# Patient Record
Sex: FEMALE | Race: WHITE | NOT HISPANIC OR LATINO | Employment: UNEMPLOYED
[De-identification: names, ages, dates, MRNs, and addresses within clinical notes are randomized per-mention and may not be internally consistent; named-entity substitution may affect disease eponyms.]

---

## 2022-12-02 ENCOUNTER — HOSPITAL ENCOUNTER (OUTPATIENT)
Dept: DATA CONVERSION | Age: 32
End: 2022-12-02
Attending: NURSE PRACTITIONER

## 2024-02-22 ENCOUNTER — OFFICE VISIT (OUTPATIENT)
Dept: URGENT CARE | Facility: CLINIC | Age: 34
End: 2024-02-22
Payer: COMMERCIAL

## 2024-02-22 VITALS
WEIGHT: 130 LBS | DIASTOLIC BLOOD PRESSURE: 77 MMHG | BODY MASS INDEX: 24.55 KG/M2 | RESPIRATION RATE: 16 BRPM | TEMPERATURE: 98.2 F | OXYGEN SATURATION: 98 % | SYSTOLIC BLOOD PRESSURE: 118 MMHG | HEIGHT: 61 IN | HEART RATE: 94 BPM

## 2024-02-22 DIAGNOSIS — N61.0 BREAST INFECTION: Primary | ICD-10-CM

## 2024-02-22 PROCEDURE — 99212 OFFICE O/P EST SF 10 MIN: CPT

## 2024-02-22 RX ORDER — AMOXICILLIN AND CLAVULANATE POTASSIUM 875; 125 MG/1; MG/1
1 TABLET, FILM COATED ORAL 2 TIMES DAILY
Qty: 14 TABLET | Refills: 0 | Status: SHIPPED | OUTPATIENT
Start: 2024-02-22 | End: 2024-02-29

## 2024-02-22 NOTE — PROGRESS NOTES
[facility] GIANFRANCO NOTE:      Name: Sandi Miranda, 33 y.o.    CSN:[id number]   MRN:[id number]    PCP: Meka Hannon, DO    ALL:    Allergies   Allergen Reactions    Codeine Hives and Swelling    Ibuprofen Hives and Itching       History:    Chief Complaint: Infection (Left breast infection.)    Encounter Date: 2/22/2024      HPI: The history was obtained from the patient. Sandi is a 33 y.o. female, who presents with a chief complaint of Infection (Left breast infection.). She states she had a nipple piercing x 1 year ago that became infected. She states she was on abx, unsure of which one but took it 4 times a day. She states she completed the abx course and it seemed to clear the infection. She states over the last week she has had some tenderness and redness of the nipple area. She denies fevers, N/V, headache, chest pain, sob.     PMHx:    Past Medical History:   Diagnosis Date    Encounter for gynecological examination (general) (routine) without abnormal findings     Pap test, as part of routine gynecological examination    Other conditions influencing health status     History of vaginal delivery    Other conditions influencing health status     Menarche              Current Outpatient Medications   Medication Sig Dispense Refill    amoxicillin-pot clavulanate (Augmentin) 875-125 mg tablet Take 1 tablet by mouth 2 times a day for 7 days. 14 tablet 0     No current facility-administered medications for this visit.         PMSx:    Past Surgical History:   Procedure Laterality Date    OTHER SURGICAL HISTORY  02/08/2020    Tonsillectomy with adenoidectomy    OTHER SURGICAL HISTORY  02/08/2020    Colposcopy    OTHER SURGICAL HISTORY  02/08/2020    Surgery       Fam Hx: No family history on file.    SOC. Hx:     Social History     Socioeconomic History    Marital status: Legally      Spouse name: Not on file    Number of children: Not on file    Years of  education: Not on file    Highest education level: Not on file   Occupational History    Not on file   Tobacco Use    Smoking status: Not on file    Smokeless tobacco: Not on file   Substance and Sexual Activity    Alcohol use: Not on file    Drug use: Not on file    Sexual activity: Not on file   Other Topics Concern    Not on file   Social History Narrative    Not on file     Social Determinants of Health     Financial Resource Strain: Not on file   Food Insecurity: Not on file   Transportation Needs: Not on file   Physical Activity: Not on file   Stress: Not on file   Social Connections: Not on file   Intimate Partner Violence: Not on file   Housing Stability: Not on file         Vitals:    02/22/24 1610   BP: 118/77   Pulse: 94   Resp: 16   Temp: 36.8 °C (98.2 °F)   SpO2: 98%     59 kg (130 lb)          Physical Exam  Constitutional:       Appearance: Normal appearance.   Eyes:      Extraocular Movements: Extraocular movements intact.      Conjunctiva/sclera: Conjunctivae normal.      Pupils: Pupils are equal, round, and reactive to light.   Cardiovascular:      Rate and Rhythm: Normal rate.   Pulmonary:      Effort: Pulmonary effort is normal.   Chest:   Breasts:     Right: Normal.      Left: Tenderness present.          Comments: Exam performed with price Mir MA present.  Skin:     General: Skin is warm.   Neurological:      General: No focal deficit present.      Mental Status: She is alert and oriented to person, place, and time.   Psychiatric:         Mood and Affect: Mood normal.         Behavior: Behavior normal.       LABORATORY @ RADIOLOGICAL IMAGING (if done):     No results found for this or any previous visit (from the past 24 hour(s)).    [facility] COURSE/MEDICAL DECISION MAKING:    Sandi is a 33 y.o., who presents with a working diagnosis of   1. Breast infection      Sandi was seen today for infection.  Diagnoses and all orders for this visit:  Breast infection (Primary)  -      amoxicillin-pot clavulanate (Augmentin) 875-125 mg tablet; Take 1 tablet by mouth 2 times a day for 7 days.    Current plan is to treat infection with Augmentin. At this time there is no hardening or fluctuation of lesion requiring drainage. Patient states last time she took an abx 4x daily which I am assuming is Keflex but I am not able to confirm. Warm compresses may help. Tylenol for pain. Push fluids. Discussed with patient if area becomes fluctuant or hard she may need drainage of the lesion. If she develops fevers, N/V, headache, failure to improve or worsening of symptoms in any way she should report to the ER immediately. Patient was agreeable to this plan.     Jaleesa Paiz PA-C   Advanced Practice Provider  [facility]